# Patient Record
Sex: FEMALE | Race: BLACK OR AFRICAN AMERICAN | Employment: PART TIME | URBAN - METROPOLITAN AREA
[De-identification: names, ages, dates, MRNs, and addresses within clinical notes are randomized per-mention and may not be internally consistent; named-entity substitution may affect disease eponyms.]

---

## 2017-02-27 PROCEDURE — 87624 HPV HI-RISK TYP POOLED RSLT: CPT | Performed by: OBSTETRICS & GYNECOLOGY

## 2017-02-27 PROCEDURE — 88175 CYTOPATH C/V AUTO FLUID REDO: CPT | Performed by: OBSTETRICS & GYNECOLOGY

## 2018-04-18 PROCEDURE — 88175 CYTOPATH C/V AUTO FLUID REDO: CPT | Performed by: OBSTETRICS & GYNECOLOGY

## 2024-07-13 ENCOUNTER — HOSPITAL ENCOUNTER (EMERGENCY)
Facility: HOSPITAL | Age: 29
Discharge: HOME | End: 2024-07-13
Attending: EMERGENCY MEDICINE
Payer: OTHER MISCELLANEOUS

## 2024-07-13 ENCOUNTER — APPOINTMENT (OUTPATIENT)
Dept: GENERAL RADIOLOGY | Facility: HOSPITAL | Age: 29
End: 2024-07-13
Payer: OTHER MISCELLANEOUS

## 2024-07-13 VITALS
RESPIRATION RATE: 16 BRPM | SYSTOLIC BLOOD PRESSURE: 136 MMHG | HEIGHT: 68 IN | BODY MASS INDEX: 29.55 KG/M2 | OXYGEN SATURATION: 99 % | HEART RATE: 75 BPM | WEIGHT: 195 LBS | TEMPERATURE: 99 F | DIASTOLIC BLOOD PRESSURE: 78 MMHG

## 2024-07-13 DIAGNOSIS — S39.012A LUMBOSACRAL STRAIN, INITIAL ENCOUNTER: Primary | ICD-10-CM

## 2024-07-13 PROCEDURE — 72110 X-RAY EXAM L-2 SPINE 4/>VWS: CPT

## 2024-07-13 PROCEDURE — 99283 EMERGENCY DEPT VISIT LOW MDM: CPT

## 2024-07-13 PROCEDURE — 99284 EMERGENCY DEPT VISIT MOD MDM: CPT

## 2024-07-13 RX ORDER — HYDROCODONE BITARTRATE AND ACETAMINOPHEN 5; 325 MG/1; MG/1
1 TABLET ORAL EVERY 4 HOURS PRN
Qty: 10 TABLET | Refills: 0 | Status: SHIPPED | OUTPATIENT
Start: 2024-07-13 | End: 2024-07-18

## 2024-07-13 NOTE — ED INITIAL ASSESSMENT (HPI)
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  Pt states pain shot down bilateral legs.  No loss of bladder, N/T.  Pt took 1000mg tylenol and 600mg ibuprofen and lidocaine patch applied.

## 2024-07-13 NOTE — ED QUICK NOTES
Patient refusing to change into gown once in exam room, states her back hurts too much. Patient ambulatory to and from xray.

## 2024-07-14 NOTE — ED PROVIDER NOTES
Patient Seen in: [facility] Emergency Department      History     Chief Complaint   Patient presents with    Back Pain     Stated Complaint: Back pain while working    Subjective:   HPI    Patient presents with low back pain.  The patient was at work and went to reach for something over-the-counter and felt a pop and sudden pain in her lower back.  She states that she felt some pain going into her legs briefly but that subsided.  She took Motrin, Tylenol and placed a lidocaine patch in the area.  She does feel somewhat better.  She still has pain with movement in the lower back.  She denies any numbness or weakness in her legs.  She denies any bowel or bladder dysfunction.  She does not have a history of back problems.    Objective:   History reviewed. No pertinent past medical history.           History reviewed. No pertinent surgical history.             Social History     Socioeconomic History    Marital status: Single   Tobacco Use    Smoking status: Never    Smokeless tobacco: Never   Substance and Sexual Activity    Alcohol use: No     Alcohol/week: 0.0 standard drinks of alcohol    Drug use: No    Sexual activity: Yes     Partners: Male     Social Determinants of Health     Financial Resource Strain: Not on File (2/12/2024)    Received from [email or web address]    Financial Resource Strain     Financial Resource Strain: 0   Food Insecurity: Not on File (2/12/2024)    Received from [email or web address]    Food Insecurity     Food: 0   Transportation Needs: Not on File (2/12/2024)    Received from [email or web address]    Transportation Needs     Transportation: 0   Physical Activity: Not on File (2/12/2024)    Received from [email or web address]    Physical Activity     Physical Activity: 0   Stress: Not on File (2/12/2024)    Received from [email or web address]    Stress     Stress: 0   Social Connections: Not on File (2/12/2024)    Received from [email or web address]    Social Connections     Social Connections and Isolation: 0   Housing Stability: Not on File (2/12/2024)    Received from  [facility]    Housing Stability     Housin              Review of Systems    Positive for stated Chief Complaint: Back Pain    Other systems are as noted in HPI.  Constitutional and vital signs reviewed.      All other systems reviewed and negative except as noted above.    Physical Exam     ED Triage Vitals [24 1543]   /83   Pulse 75   Resp 16   Temp 98.5 °F (36.9 °C)   Temp src Temporal   SpO2 99 %   O2 Device None (Room air)       Current Vitals:   Vital Signs  BP: 136/78  Pulse: 75  Resp: 16  Temp: 98.5 °F (36.9 °C)  Temp src: Temporal    Oxygen Therapy  SpO2: 99 %  O2 Device: None (Room air)            Physical Exam    General: Alert and oriented x3 in no acute distress.  Back: Minimal tenderness in the lower lumbosacral spine.  Extremities: No CCE.  Skin: Warm and dry.  Neurologic: Strength 5/5 in lower extremities.  Sensory exam grossly intact.    ED Course   Labs Reviewed - No data to display  I personally reviewed the spine films and no acute fracture, disc space narrowing at L5-S1 noted.        XR LUMBAR SPINE (MIN 4 VIEWS) (CPT=72110)    Result Date: 2024  PROCEDURE:  XR LUMBAR SPINE (MIN 4 VIEWS) (CPT=72110)  TECHNIQUE:  AP, lateral, oblique, and coned down L5-S1 views were obtained.  COMPARISON:  None.  INDICATIONS:  Back pain while working  PATIENT STATED HISTORY: (As transcribed by Technologist)     FINDINGS:    BONES:  Normal.  No significant spondylosis, scoliosis, fracture, or visible bony lesion. DISC SPACES:  Mild-to-moderate disc space narrowing is evident at L5-S1.  The remaining disc spaces are preserved. PARASPINOUS:  Negative.  No paraspinous abnormality is seen. OTHER:  Negative.             CONCLUSION:  There is degenerative disc disease at L5-S1.   LOCATION:  [facility]   Dictated by (CST): Chaitanya Braun MD on 2024 at 4:20 PM     Finalized by (CST): Chaitanya Braun MD on 2024 at 4:20 PM               MDM      Patient presents with low back pain.  Differential diagnosis  includes but is not limited to lumbar sprain, herniated disc and muscle spasm.  The patient does not have radicular symptoms at this time.  She is neurologically intact.  Her x-rays do show some underlying degenerative changes at L5-S1.  The patient was counseled regarding ongoing supportive care measures.  Hopefully this is just a strain and she improves with rest and time.  She will follow-up with [facility] and they will determine need for further imaging if she is not improving.  If she develops new neurologic symptoms she should return for repeat evaluation.  She will be discharged with a limited number of narcotic pain pills to take as needed but was counseled regarding judicious use.                             MDM    Disposition and Plan     Clinical Impression:  1. Lumbosacral strain, initial encounter         Disposition:  Discharge  7/13/2024  5:21 pm    Follow-up:  Tigre Macedo MD  [address] [place] [address]  [phone]    Schedule an appointment as soon as possible for a visit in 2 day(s)            Medications Prescribed:  Discharge Medication List as of 7/13/2024  5:22 PM        START taking these medications    Details   HYDROcodone-acetaminophen 5-325 MG Oral Tab Take 1 tablet by mouth every 4 (four) hours as needed., Print, Disp-10 tablet, R-0